# Patient Record
Sex: MALE | Race: BLACK OR AFRICAN AMERICAN | Employment: OTHER | URBAN - METROPOLITAN AREA
[De-identification: names, ages, dates, MRNs, and addresses within clinical notes are randomized per-mention and may not be internally consistent; named-entity substitution may affect disease eponyms.]

---

## 2022-04-12 ENCOUNTER — OFFICE VISIT (OUTPATIENT)
Dept: ORTHOPEDIC SURGERY | Age: 52
End: 2022-04-12

## 2022-04-12 VITALS — BODY MASS INDEX: 35.55 KG/M2 | WEIGHT: 240 LBS | HEIGHT: 69 IN

## 2022-04-12 DIAGNOSIS — G89.29 CHRONIC PAIN OF LEFT KNEE: ICD-10-CM

## 2022-04-12 DIAGNOSIS — M25.562 CHRONIC PAIN OF BOTH KNEES: Primary | ICD-10-CM

## 2022-04-12 DIAGNOSIS — G89.29 CHRONIC PAIN OF BOTH KNEES: Primary | ICD-10-CM

## 2022-04-12 DIAGNOSIS — M25.562 CHRONIC PAIN OF LEFT KNEE: ICD-10-CM

## 2022-04-12 DIAGNOSIS — M25.561 CHRONIC PAIN OF BOTH KNEES: Primary | ICD-10-CM

## 2022-04-12 PROCEDURE — 99203 OFFICE O/P NEW LOW 30 MIN: CPT | Performed by: ORTHOPAEDIC SURGERY

## 2022-04-12 RX ORDER — MELATONIN
DAILY
COMMUNITY

## 2022-04-12 RX ORDER — OMEPRAZOLE 20 MG/1
20 CAPSULE, DELAYED RELEASE ORAL DAILY
COMMUNITY

## 2022-04-12 RX ORDER — PRAZOSIN HYDROCHLORIDE 1 MG/1
CAPSULE ORAL
COMMUNITY

## 2022-04-12 RX ORDER — SUMATRIPTAN 50 MG/1
50 TABLET, FILM COATED ORAL
COMMUNITY

## 2022-04-12 RX ORDER — HYDROXYZINE 25 MG/1
TABLET, FILM COATED ORAL
COMMUNITY

## 2022-04-12 RX ORDER — HYDROCHLOROTHIAZIDE 25 MG/1
25 TABLET ORAL DAILY
COMMUNITY

## 2022-04-12 RX ORDER — MINERAL OIL
ENEMA (ML) RECTAL
COMMUNITY

## 2022-04-12 RX ORDER — NIFEDIPINE 30 MG/1
TABLET, FILM COATED, EXTENDED RELEASE ORAL DAILY
COMMUNITY

## 2022-04-12 RX ORDER — PREDNISONE 20 MG/1
TABLET ORAL
COMMUNITY

## 2022-04-12 RX ORDER — SERTRALINE HYDROCHLORIDE 100 MG/1
TABLET, FILM COATED ORAL DAILY
COMMUNITY

## 2022-04-12 RX ORDER — TRAZODONE HYDROCHLORIDE 50 MG/1
TABLET ORAL
COMMUNITY

## 2022-04-12 NOTE — PROGRESS NOTES
Maisha (: 1970) is a 45 y.o. male patient, here for evaluation of the following chief complaint(s):  Knee Pain (bilateral knee pain)       ASSESSMENT/PLAN:  Below is the assessment and plan developed based on review of pertinent history, physical exam, labs, studies, and medications. 48-year-old male comes in today with complaints of bilateral knee pain left worse than right. He has been pursuing conservative treatment for more than 3 or 4 months. He had steroid injections in January and said these helped for about 4 weeks. He has also had some therapy. He cannot take anti-inflammatory medications due to a kidney disorder. Exam consistent with possible meniscus tear. X-rays are relatively benign. Considering this been going on for such a long. And he has failed conservative treatment we will go ahead and order an MRI to rule out meniscus tear versus early arthritic change. Follow-up when complete      1. Chronic pain of both knees  -     XR KNEES BI MIN 4 V; Future  2. Chronic pain of left knee  -     MRI KNEE LT WO CONT; Future      Encounter Diagnoses   Name Primary?  Chronic pain of both knees Yes    Chronic pain of left knee         No follow-ups on file. SUBJECTIVE/OBJECTIVE:  Dakota Robertson . (: 1970) is a 45 y.o. male who presents today for the following:  Chief Complaint   Patient presents with    Knee Pain     bilateral knee pain       48-year-old male comes in today with complaints of bilateral knee pain left worse than right. He has been pursuing conservative treatment for more than 3 or 4 months. He had steroid injections in January and said these helped for about 4 weeks. He has also had some therapy. He cannot take anti-inflammatory medications due to a kidney disorder    IMAGING:  XR Results (most recent):  Results from Appointment encounter on 22    XR KNEES BI MIN 4 V    Narrative  4 views ordered and independently reviewed left knee. Joint spaces are well preserved. No obvious osteophyte formation present. No fractures. Patella tracking centrally bilaterally. Allergies   Allergen Reactions    Ace Inhibitors Unknown (comments)       Current Outpatient Medications   Medication Sig    NIFEdipine ER (ADALAT CC) 30 mg ER tablet Take  by mouth daily.  hydroCHLOROthiazide (HYDRODIURIL) 25 mg tablet Take 25 mg by mouth daily.  cholecalciferol (VITAMIN D3) (1000 Units /25 mcg) tablet Take  by mouth daily.  omeprazole (PRILOSEC) 20 mg capsule Take 20 mg by mouth daily.  hydrOXYzine HCL (ATARAX) 25 mg tablet Take  by mouth three (3) times daily as needed.  sertraline (ZOLOFT) 100 mg tablet Take  by mouth daily.  traZODone (DESYREL) 50 mg tablet Take  by mouth nightly.  prazosin (MINIPRESS) 1 mg capsule Take  by mouth nightly.  fexofenadine (ALLEGRA) 180 mg tablet Take  by mouth.  predniSONE (DELTASONE) 20 mg tablet Take  by mouth daily (with breakfast).  SUMAtriptan (IMITREX) 50 mg tablet Take 50 mg by mouth once as needed for Migraine. No current facility-administered medications for this visit. Past Medical History:   Diagnosis Date    Diabetes ([facility] [address]     Hypertension         Past Surgical History:   Procedure Laterality Date    HX APPENDECTOMY         No family history on file. Social History     Tobacco Use    Smoking status: Never Smoker    Smokeless tobacco: Never Used   Substance Use Topics    Alcohol use: Yes        All systems reviewed x 12 and were negative with the exception of None      No flowsheet data found. Vitals:  Ht 5' 9\" (1.753 m)   Wt 240 lb (108.9 kg)   BMI 35.44 kg/m²    Body mass index is 35.44 kg/m². Physical Exam    General: NAD, well developed, well nourished, alert and oriented x 3. Cardiac: Extremities well perfused    Respiratory: Nonlabored breathing    LLE: Minimal antalgic gait. Minimal effusion noted. No previous incisions noted. ROM 0-120 degrees. Grossly stable to varus/valgus stress and anterior/posterior drawer tests. Mild medial joint tenderness. Pain with Gwyn's. Negative Stinchfield motor grossly intact. RLE: Normal gait and station. Negative stinchfield. No effusion noted. No previous incisions noted. ROM 0-120 degrees. Grossly stable to varus/valgus stress and anterior/posterior drawer tests. Negative McMurrays. Motor grossly intact. Vascular: Palpable pedal pulses, equal bilaterally. Skin: Warm well perfused, cap refill < 2 sec. An electronic signature was used to authenticate this note.   -- Miguelangel Vazquez MD

## 2024-12-15 ENCOUNTER — HOSPITAL ENCOUNTER (EMERGENCY)
Facility: HOSPITAL | Age: 54
Discharge: HOME | End: 2024-12-15
Attending: EMERGENCY MEDICINE
Payer: OTHER GOVERNMENT

## 2024-12-15 VITALS
OXYGEN SATURATION: 100 % | TEMPERATURE: 98.9 F | RESPIRATION RATE: 16 BRPM | HEIGHT: 69 IN | DIASTOLIC BLOOD PRESSURE: 117 MMHG | WEIGHT: 235 LBS | SYSTOLIC BLOOD PRESSURE: 150 MMHG | BODY MASS INDEX: 34.8 KG/M2 | HEART RATE: 80 BPM

## 2024-12-15 DIAGNOSIS — S61.412A LACERATION OF LEFT HAND WITHOUT FOREIGN BODY, INITIAL ENCOUNTER: Primary | ICD-10-CM

## 2024-12-15 PROCEDURE — 12002 RPR S/N/AX/GEN/TRNK2.6-7.5CM: CPT

## 2024-12-15 PROCEDURE — 99283 EMERGENCY DEPT VISIT LOW MDM: CPT

## 2024-12-15 PROCEDURE — 6360000002 HC RX W HCPCS

## 2024-12-15 RX ORDER — LIDOCAINE HYDROCHLORIDE 10 MG/ML
5 INJECTION, SOLUTION EPIDURAL; INFILTRATION; INTRACAUDAL; PERINEURAL ONCE
Status: COMPLETED | OUTPATIENT
Start: 2024-12-15 | End: 2024-12-15

## 2024-12-15 RX ORDER — CEPHALEXIN 500 MG/1
500 CAPSULE ORAL 3 TIMES DAILY
Qty: 21 CAPSULE | Refills: 0 | Status: SHIPPED | OUTPATIENT
Start: 2024-12-15 | End: 2024-12-22

## 2024-12-15 RX ADMIN — LIDOCAINE HYDROCHLORIDE 5 ML: 10 INJECTION, SOLUTION EPIDURAL; INFILTRATION; INTRACAUDAL; PERINEURAL at 14:28

## 2024-12-15 ASSESSMENT — PAIN SCALES - GENERAL: PAINLEVEL_OUTOF10: 1

## 2024-12-15 ASSESSMENT — PAIN - FUNCTIONAL ASSESSMENT: PAIN_FUNCTIONAL_ASSESSMENT: 0-10

## 2024-12-15 ASSESSMENT — LIFESTYLE VARIABLES
HOW OFTEN DO YOU HAVE A DRINK CONTAINING ALCOHOL: NEVER
HOW MANY STANDARD DRINKS CONTAINING ALCOHOL DO YOU HAVE ON A TYPICAL DAY: PATIENT DOES NOT DRINK

## 2024-12-15 ASSESSMENT — PAIN DESCRIPTION - LOCATION: LOCATION: HAND

## 2024-12-15 NOTE — ED TRIAGE NOTES
Pt arrives with cc laceration on left hand. Pt controlled bleeding PTA. Unknown tetanus vaccine status.

## 2024-12-15 NOTE — DISCHARGE INSTRUCTIONS
*Please return to the emergency department, your primary care provider, or urgent care in 8 days to have your 4 sutures removed.*    WOUND CARE:  Wash hands with soap and water.  Apply soap and water to wound with Q-tip or cotton ball to remove crust 2-3 times per day, do not allow for scabbing/dried blood to accumulate a wound margins  Pat dry thoroughly with soft cloth  Reapply a thin layer of bacitracin or mupirocin for 3 to 5 days with a Q-tip and then transition to Vaseline for 2 weeks.  Cover with a clean, dry dressing.  Continue steps 1 through 5 until stitches are removed.    AFTER SUTURE REMOVAL:  After 2 weeks of Vaseline, recommend purchasing and transitioning to silicone based scar gel at local [facility] and applying for 3 to for 6 months.  Once the skin has healed and closed, keep out of sun and apply sunscreen daily for up to 3 to 6 months to help prevent scar formation.    THINGS THAT MAY HAPPEN FOLLOWING LACERATION REPAIR:   Bleeding --reduce the risk of bleeding, limit activities for at least 24 hours.  Should this occur apply firm, direct pressure for 10 to 20 minutes.  Pain --generally, only slight pain may occur.  Extra strength Tylenol should relieve most pain, you can take this 3 times per day.  Do not exceed 3 g/day  Infection --will occur less frequently when instructions have been followed.  Take antibiotics if they have been prescribed.  Return to the emergency department earlier if you develop fevers, if you see pus coming from the wound, or if you develop redness around the wound that extends beyond 1 inch from the wound edges as these can be signs of wound infection.  Scarring --there will be a scar and redness after wound repair.  This will decrease as healing progresses.  The redness may last as long as 6 months.  Everyone heals differently and the final appearance of the scar depends on the individual's ability to heal.  Some scars heal and are hardly seen while others may become

## 2024-12-15 NOTE — ED PROVIDER NOTES
[facility] EMERGENCY DEPT  EMERGENCY DEPARTMENT ENCOUNTER      Pt Name: Magdi Lewis Sr.  MRN: [id number]  Birthdate 1970  Date of evaluation: 12/15/2024  Provider: Toi Barros PA-C    CHIEF COMPLAINT       Chief Complaint   Patient presents with    Laceration         HISTORY OF PRESENT ILLNESS    Patient is a 54-year-old male with history of diabetes and hypertension who presents emergency room with reports of a laceration on the dorsal aspect of the left hand.  Patient reports he cut it with a .  Patient reports unknown tetanus status, but per chart review patient had his tetanus in 2022 and therefore is up-to-date.  No active bleeding. Patient denies chest pain, shortness of breath, abdominal pain, urinary symptoms, nausea or vomiting, diarrhea or constipation, headache, dizziness, lightheadedness, fever or chills.  Patient reports alcohol use, denies smoking/vaping or illicit drug use.          Nursing Notes were reviewed.    REVIEW OF SYSTEMS       Review of Systems      PAST MEDICAL HISTORY     Past Medical History:   Diagnosis Date    Diabetes (HCC)     Hypertension          SURGICAL HISTORY       Past Surgical History:   Procedure Laterality Date    APPENDECTOMY           CURRENT MEDICATIONS       Discharge Medication List as of 12/15/2024  2:54 PM        CONTINUE these medications which have NOT CHANGED    Details   vitamin D (CHOLECALCIFEROL) 25 MCG (1000 UT) TABS tablet Take by mouth dailyHistorical Med      fexofenadine (ALLEGRA) 180 MG tablet Take by mouthHistorical Med      hydroCHLOROthiazide (HYDRODIURIL) 25 MG tablet Take 1 tablet by mouth dailyHistorical Med      hydrOXYzine HCl (ATARAX) 25 MG tablet Take by mouth 3 times daily as neededHistorical Med      NIFEdipine (ADALAT CC) 30 MG extended release tablet Take by mouth dailyHistorical Med      omeprazole (PRILOSEC) 20 MG delayed release capsule Take 1 capsule by mouth dailyHistorical Med      prazosin (MINIPRESS) 1 MG capsule Take by